# Patient Record
Sex: MALE | URBAN - METROPOLITAN AREA
[De-identification: names, ages, dates, MRNs, and addresses within clinical notes are randomized per-mention and may not be internally consistent; named-entity substitution may affect disease eponyms.]

---

## 2021-10-06 ENCOUNTER — EMERGENCY (EMERGENCY)
Facility: HOSPITAL | Age: 21
LOS: 1 days | Discharge: HOME | End: 2021-10-06
Attending: EMERGENCY MEDICINE
Payer: COMMERCIAL

## 2021-10-06 VITALS
HEART RATE: 64 BPM | SYSTOLIC BLOOD PRESSURE: 104 MMHG | OXYGEN SATURATION: 100 % | RESPIRATION RATE: 20 BRPM | DIASTOLIC BLOOD PRESSURE: 69 MMHG | TEMPERATURE: 99 F

## 2021-10-06 VITALS
DIASTOLIC BLOOD PRESSURE: 81 MMHG | TEMPERATURE: 98 F | SYSTOLIC BLOOD PRESSURE: 117 MMHG | OXYGEN SATURATION: 100 % | HEIGHT: 71 IN | WEIGHT: 125 LBS | HEART RATE: 82 BPM | RESPIRATION RATE: 20 BRPM

## 2021-10-06 PROCEDURE — 99283 EMERGENCY DEPT VISIT LOW MDM: CPT

## 2021-10-06 RX ORDER — ACETAMINOPHEN 500 MG
975 TABLET ORAL ONCE
Refills: 0 | Status: COMPLETED | OUTPATIENT
Start: 2021-10-06 | End: 2021-10-06

## 2021-10-06 NOTE — ED PROVIDER NOTE - NSFOLLOWUPINSTRUCTIONS_ED_ALL_ED_FT
1. Stay hydrated. Avoid strenuous activity, twisting and heavy lifting.  2. Recommend applying ice packs in 20 minute intervals to areas of pain. For residual pain you may take over the counter Tylenol and/or Motrin as per label instructions   3. Follow up with your PCP  24-48 hours  4. Return to ED for worsening of symptoms including persistent headaches, weakness, numbness, vomiting and any other concerns 1. Stay hydrated. Avoid strenuous activity, twisting and heavy lifting    2. Recommend applying ice packs in 20 minute intervals to areas of pain. For residual pain you may take over the counter Tylenol and/or Motrin as per label instructions     3. Follow up with your PCP  24-48 hours    4. Return to ED for worsening of symptoms including persistent headaches, weakness, numbness, vomiting and any other concerns

## 2021-10-06 NOTE — ED PROVIDER NOTE - PATIENT PORTAL LINK FT
You can access the FollowMyHealth Patient Portal offered by [facility] by registering at the following website: [email or web address]. By joining [facility]’s FollowMyHealth portal, you will also be able to view your health information using other applications (apps) compatible with our system.
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
## 2021-10-06 NOTE — ED ADULT NURSE NOTE - HIV OFFER
DISCHARGE ORDERS NOTED. PATIENT TO BE DISCHARGED AFTER AM DOSE OF ANTIBIOTIC IS COMPLETED AND AMBULANCE TRANSPORTATION HAS BEEN ARRANGED PER CASE MANAGEMENT. WILL CONTINUE TO MONITOR.    Opt out
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
